# Patient Record
Sex: MALE | Race: BLACK OR AFRICAN AMERICAN | Employment: UNEMPLOYED | RURAL
[De-identification: names, ages, dates, MRNs, and addresses within clinical notes are randomized per-mention and may not be internally consistent; named-entity substitution may affect disease eponyms.]

---

## 2017-07-26 ENCOUNTER — OFFICE VISIT (OUTPATIENT)
Dept: FAMILY MEDICINE CLINIC | Age: 27
End: 2017-07-26

## 2017-07-26 VITALS
TEMPERATURE: 98.9 F | RESPIRATION RATE: 16 BRPM | OXYGEN SATURATION: 97 % | HEIGHT: 77 IN | DIASTOLIC BLOOD PRESSURE: 92 MMHG | HEART RATE: 87 BPM | SYSTOLIC BLOOD PRESSURE: 136 MMHG

## 2017-07-26 DIAGNOSIS — E66.01 MORBID OBESITY DUE TO EXCESS CALORIES (HCC): ICD-10-CM

## 2017-07-26 DIAGNOSIS — G47.33 OSA (OBSTRUCTIVE SLEEP APNEA): ICD-10-CM

## 2017-07-26 DIAGNOSIS — F32.A DEPRESSION, UNSPECIFIED DEPRESSION TYPE: ICD-10-CM

## 2017-07-26 DIAGNOSIS — M54.5 CHRONIC LOW BACK PAIN, UNSPECIFIED BACK PAIN LATERALITY, WITH SCIATICA PRESENCE UNSPECIFIED: ICD-10-CM

## 2017-07-26 DIAGNOSIS — G89.29 CHRONIC LOW BACK PAIN, UNSPECIFIED BACK PAIN LATERALITY, WITH SCIATICA PRESENCE UNSPECIFIED: ICD-10-CM

## 2017-07-26 DIAGNOSIS — Z23 ENCOUNTER FOR IMMUNIZATION: ICD-10-CM

## 2017-07-26 DIAGNOSIS — I10 ESSENTIAL HYPERTENSION: Primary | ICD-10-CM

## 2017-07-26 PROBLEM — M54.50 CHRONIC LOW BACK PAIN: Status: ACTIVE | Noted: 2017-07-26

## 2017-07-26 NOTE — LETTER
7/27/2017 1:10 PM 
 
Mr. Imelda Butt Morning [address] [address] Dear Rubin CAMACHO Morning: 
 
Please find your most recent results below. All labs within normal limits per Dr. Chirag Garg Resulted Orders CBC WITH AUTOMATED DIFF Result Value Ref Range WBC 11.3 (H) 3.4 - 10.8 x10E3/uL  
 RBC 4.56 4.14 - 5.80 x10E6/uL HGB 12.7 12.6 - 17.7 g/dL HCT 39.7 37.5 - 51.0 % MCV 87 79 - 97 fL  
 MCH 27.9 26.6 - 33.0 pg  
 MCHC 32.0 31.5 - 35.7 g/dL  
 RDW 14.6 12.3 - 15.4 % PLATELET 274 527 - 757 x10E3/uL NEUTROPHILS 64 % Lymphocytes 27 % MONOCYTES 6 % EOSINOPHILS 3 % BASOPHILS 0 %  
 ABS. NEUTROPHILS 7.2 (H) 1.4 - 7.0 x10E3/uL Abs Lymphocytes 3.1 0.7 - 3.1 x10E3/uL  
 ABS. MONOCYTES 0.7 0.1 - 0.9 x10E3/uL  
 ABS. EOSINOPHILS 0.3 0.0 - 0.4 x10E3/uL  
 ABS. BASOPHILS 0.0 0.0 - 0.2 x10E3/uL IMMATURE GRANULOCYTES 0 %  
 ABS. IMM. GRANS. 0.0 0.0 - 0.1 x10E3/uL Narrative Performed at:  [address]  [id number] : Mark Wing MD, Phone:  [phone] METABOLIC PANEL, COMPREHENSIVE Result Value Ref Range Glucose 92 65 - 99 mg/dL Comment:  
   Specimen received in contact with cells. No visible hemolysis 
present. However GLUC may be decreased and K increased. Clinical 
correlation indicated. BUN 10 6 - 20 mg/dL Creatinine 0.70 (L) 0.76 - 1.27 mg/dL GFR est non- >59 mL/min/1.73 GFR est  >59 mL/min/1.73  
 BUN/Creatinine ratio 14 9 - 20 Sodium 143 134 - 144 mmol/L Potassium 4.3 3.5 - 5.2 mmol/L Chloride 105 96 - 106 mmol/L  
 CO2 21 18 - 29 mmol/L Calcium 9.0 8.7 - 10.2 mg/dL Protein, total 7.4 6.0 - 8.5 g/dL Albumin 3.9 3.5 - 5.5 g/dL GLOBULIN, TOTAL 3.5 1.5 - 4.5 g/dL A-G Ratio 1.1 (L) 1.2 - 2.2 Bilirubin, total 0.3 0.0 - 1.2 mg/dL Alk. phosphatase 92 39 - 117 IU/L  
 AST (SGOT) 26 0 - 40 IU/L  
 ALT (SGPT) 23 0 - 44 IU/L Narrative Performed at:  [address]  [id number] : Regine Lobo MD, Phone:  [phone] LIPID PANEL Result Value Ref Range Cholesterol, total 175 100 - 199 mg/dL Triglyceride 75 0 - 149 mg/dL HDL Cholesterol 46 >39 mg/dL VLDL, calculated 15 5 - 40 mg/dL LDL, calculated 114 (H) 0 - 99 mg/dL Narrative Performed at:  [address]  [id number] : Regine Lobo MD, Phone:  [phone] URINALYSIS W/ RFLX MICROSCOPIC Result Value Ref Range Specific Gravity 1.029 1.005 - 1.030  
 pH (UA) 5.5 5.0 - 7.5 Color Yellow Yellow Appearance Clear Clear Leukocyte Esterase Negative Negative Protein 1+ (A) Negative/Trace Glucose Negative Negative Ketone Negative Negative Blood Negative Negative Bilirubin Negative Negative Urobilinogen 0.2 0.2 - 1.0 mg/dL Nitrites Negative Negative Microscopic Examination See additional order Comment:  
   Microscopic was indicated and was performed. Narrative Performed at:  [address]  [id number] : Regine Lobo MD, Phone:  [phone] TSH 3RD GENERATION Result Value Ref Range TSH 2.830 0.450 - 4.500 uIU/mL Narrative Performed at:  [address]  [id number] : Regine Lobo MD, Phone:  [phone] CVD REPORT Result Value Ref Range INTERPRETATION Note Comment:  
   Supplement report is available. Narrative Performed at:  [address] [place] [address]  [id number] : Kimball Brittle PhD, Phone:  [phone] MICROSCOPIC EXAMINATION Result Value Ref Range WBC 0-5 0 - 5 /hpf  
 RBC None seen 0 - 2 /hpf Epithelial cells 0-10 0 - 10 /hpf Casts None seen None seen /lpf Mucus Present Not Estab. Bacteria Few None seen/Few Narrative Performed at:  [address]  [id number] : Jennifer Herron MD, Phone:  [phone] RECOMMENDATIONS: 
None. Keep up the good work! Please call me if you have any questions: [phone] Sincerely, Dimas Johnson MD

## 2017-07-26 NOTE — MR AVS SNAPSHOT
Visit Information Date & Time Provider Department Dept. Phone Encounter #  
 7/26/2017 10:40 AM Gorge Benz  [address] [id number] Follow-up Instructions Return in about 1 month (around 8/26/2017). Your Appointments 8/17/2017  9:50 AM  
ESTABLISHED PATIENT with Gorge Benz MD  
[facility] Appt Note: 1 mo f/u $0 CP mbm  
 [address] [address] [facility] [phone]  
[phone]  
  
   
 [address] [facility] [address] Upcoming Health Maintenance Date Due DTaP/Tdap/Td series (1 - Tdap) 7/8/2011 INFLUENZA AGE 9 TO ADULT 8/1/2017 Allergies as of 7/26/2017  Review Complete On: 7/26/2017 By: Gorge Benz MD  
 No Known Allergies Current Immunizations  Never Reviewed No immunizations on file. Not reviewed this visit You Were Diagnosed With   
  
 Codes Comments Essential hypertension    -  Primary ICD-10-CM: I10 
ICD-9-CM: 401.9 Morbid obesity due to excess calories (HCC)     ICD-10-CM: E66.01 
ICD-9-CM: 278.01   
 JONO (obstructive sleep apnea)     ICD-10-CM: G47.33 
ICD-9-CM: 327.23 Depression, unspecified depression type     ICD-10-CM: F32.9 ICD-9-CM: 335 Chronic low back pain, unspecified back pain laterality, with sciatica presence unspecified     ICD-10-CM: M54.5, G89.29 ICD-9-CM: 724.2, 338.29 Vitals BP Pulse Temp Resp Height(growth percentile) SpO2  
 (!) 136/92 (BP 1 Location: Left arm, BP Patient Position: Sitting) 87 98.9 °F (37.2 °C) (Oral) 16 6' 4.5\" (1.943 m) 97% Smoking Status Never Smoker Your Updated Medication List  
  
Notice  As of 7/26/2017 11:56 AM  
 You have not been prescribed any medications. We Performed the Following AMB POC EKG ROUTINE W/ 12 LEADS, INTER & REP [92302 CPT(R)] CBC WITH AUTOMATED DIFF [00025 CPT(R)] LIPID PANEL [45931 CPT(R)] METABOLIC PANEL, COMPREHENSIVE [33804 CPT(R)] MO COLLECTION VENOUS BLOOD,VENIPUNCTURE [id number] CPT(R)] MO HANDLG&/OR CONVEY OF SPEC FOR TR OFFICE TO LAB [06448 CPT(R)] REFERRAL TO BARIATRICS [RCT464 CPT(R)] Comments:  
 Please evaluate patient for morbid obesity over 500 lbs SLEEP MEDICINE REFERRAL [[id number] Custom] Comments:  
 Please evaluate patient for JONO morbid obesity TSH 3RD GENERATION [64128 CPT(R)] URINALYSIS W/ RFLX MICROSCOPIC [39736 CPT(R)] Follow-up Instructions Return in about 1 month (around 8/26/2017). Referral Information Referral ID Referred By Referred To  
  
 [id number] Ova Shingles Not Available Visits Status Start Date End Date 1 New Request 7/26/17 7/26/18 If your referral has a status of pending review or denied, additional information will be sent to support the outcome of this decision. Referral ID Referred By Referred To  
 [id number] Ova Shingles Not Available Visits Status Start Date End Date 1 New Request 7/26/17 7/26/18 If your referral has a status of pending review or denied, additional information will be sent to support the outcome of this decision. Patient Instructions Same meds Awaiting records from free clinic Call with names of meds Lab work Refer to sleep center 
referral to bariatric clinic Introducing [facility] SERVICES! Denise Rodriguez introduces [employer] patient portal. Now you can access parts of your medical record, email your doctor's office, and request medication refills online. 1. In your internet browser, go to [email or web address] 2. Click on the First Time User? Click Here link in the Sign In box. You will see the New Member Sign Up page. 3. Enter your [employer] Access Code exactly as it appears below.  You will not need to use this code after youve completed the sign-up process. If you do not sign up before the expiration date, you must request a new code. · [facility] Access Code: Michelle Villegas Expires: 10/24/2017 11:03 AM 
 
4. Enter the last four digits of your Social Security Number (xxxx) and Date of Birth (mm/dd/yyyy) as indicated and click Submit. You will be taken to the next sign-up page. 5. Create a [facility] ID. This will be your [facility] login ID and cannot be changed, so think of one that is secure and easy to remember. 6. Create a [facility] password. You can change your password at any time. 7. Enter your Password Reset Question and Answer. This can be used at a later time if you forget your password. 8. Enter your e-mail address. You will receive e-mail notification when new information is available in [address] 9. Click Sign Up. You can now view and download portions of your medical record. 10. Click the Download Summary menu link to download a portable copy of your medical information. If you have questions, please visit the Frequently Asked Questions section of the [facility] website. Remember, [facility] is NOT to be used for urgent needs. For medical emergencies, dial 911. Now available from your iPhone and Android! Please provide this summary of care documentation to your next provider. Your primary care clinician is listed as Kadi Galdamez. If you have any questions after today's visit, please call [phone].

## 2017-07-26 NOTE — PROGRESS NOTES
Enoc To is a 32 y.o. male who presents to the office today with the following:  Chief Complaint   Patient presents with    Morbid Obesity     wants to discuss weight loss options       No Known Allergies    No current outpatient prescriptions on file. No current facility-administered medications for this visit. Past Medical History:   Diagnosis Date    Depression     Hypertension        History reviewed. No pertinent surgical history. History   Smoking Status    Never Smoker   Smokeless Tobacco    Never Used       Family History   Problem Relation Age of Onset    Seizures Mother     Sleep Apnea Mother     No Known Problems Father     No Known Problems Sister     Sleep Apnea Brother     No Known Problems Sister     No Known Problems Brother     No Known Problems Brother     No Known Problems Brother     Other Brother      Doesn't know him   [facility] Other Brother      Doesn't know him   [facility] Other Brother      Doesn't know him         History of Present Illness:  Patient here for initial visit to establish in this practice. Patient was previously seen at the free clinic but now has health insurance patient has morbid obesity. He has been heavy his whole life. Parents grandparents are whole quite heavy. He is tried diet he is tried exercise but he has been unsuccessful. Currently weighing over 500 pounds his mother went through bariatric surgery. Patient is interested in pursuing bariatric options. Patient admits that he does snore at night and is tired during the day. Multiple family members do have sleep apnea. He has not been screened. We are setting up this appointment    Patient recently diagnosed with hypertension. Treated with medications to the free clinic. He tolerated the medications well. He ran out about 1 month ago. He does not remember the name but we are getting the records from the sleep clinic. He tells me he had lab work through  his previous clinic.   He has not had an EKG. He has no complaints of chest pain palpitations or shortness of breath. No other cardiac history    He does not report history of diabetes or hyperlipidemia    He does have a history of depression. He admits he gets sad at times he gets angry at times. He has never had any thoughts of hurting himself. He is on a medication for depression from the sleep clinic that still he states helps. His mother apparently has bipolar disease. He denies any manic episodes. He denies any alcohol use or drug use    He does have some low back pain. This is worse when he tries to stand up for a prolonged period of time. This is likely exacerbated by his weight. It has prevented him from looking for full-time work  He denies any extremity symptoms    It is been over 10 years since his last tetanus booster. Tdap given today          Review of Systems:      Review of systems negative except as noted above    Physical Exam:  Visit Vitals    BP (!) 136/92 (BP 1 Location: Left arm, BP Patient Position: Sitting)  Comment (BP 1 Location): used forarm because no BP here fit his upper arm    Pulse 87    Temp 98.9 °F (37.2 °C) (Oral)    Resp 16    Ht 6' 4.5\" (1.943 m)    SpO2 97%     Vitals:    07/26/17 1103   BP: (!) 136/92   BP 1 Location: Left arm  Comment: used forarm because no BP here fit his upper arm   BP Patient Position: Sitting   Pulse: 87   Resp: 16   Temp: 98.9 °F (37.2 °C)   TempSrc: Oral   SpO2: 97%   Weight: Comment: Greater then 508 lbs   Height: 6' 4.5\" (1.943 m)     Patient no acute distress vital signs  as above unable to weigh patient on our scales. Blood pressure taken on his forearm secondary to the size of his arms  Head is normocephalic  Patient dressed appropriately. Good eye contact. Did not appear anxious or distressed. No psychotic or suicidal ideations  External ears normal.    Eyes PERRLA. EOMs full. Sclera clear  Nose within normal limits.   Nares  normal.  No significant congestion  OP mucosa normal, no obvious lesions. Pharynx normal.  No erythema or exudate. Structures midline. Neck no nodes no obvious masses no bruits. Limited exam secondary to size  Chest was clear no wheezes rhonchi or rales. Good air exchange  Cor distant heart sounds secondary to his size. Regular rate and rhythm no S3-S4 no murmurs  Abdomen morbidly obese no HSM no obvious masses soft and was nontender  Extremities no pitting edema. Nontender. Good range of motion  Gait and gross neurologic exam were normal  EKG showed normal sinus rhythm. Horizontal axis. Negative acute. Suspect Axis secondary to his weight and the fact that EKG was done while sitting      Assessment/Plan:  1. Essential hypertension  Patient with hypertension. Blood pressure just a bit above goal today. We are finding out what medication he has been treated with and I will be most likely calling in a refill for this. I am checking lab work and an EKG today. I am bringing him back in 1 month for recheck and follow-up  - AMB POC EKG ROUTINE W/ 12 LEADS, INTER & REP  - CBC WITH AUTOMATED DIFF  - METABOLIC PANEL, COMPREHENSIVE  - LIPID PANEL  - URINALYSIS W/ RFLX MICROSCOPIC  - TSH 3RD GENERATION    2. Morbid obesity due to excess calories [facility])  Patient with morbid obesity and probable sleep apnea. I am referring him both to bariatrics as well as the sleep clinic. He will continue working on his diet and excise program I will see him back in a month  - [address] [place] [address]    3. JONO (obstructive sleep apnea)  Referring to sleep clinic    4. Depression, unspecified depression type  Improved with his current medications. We are determining his previous medications and we will call this in    5.  Chronic low back pain, unspecified back pain laterality, with sciatica presence unspecified  Hopefully will improve with weight loss      Patient Instructions   Same meds  Awaiting records from free clinic  Call with names of meds  Lab work  Refer to sleep center  referral to bariatric clinic        Continue current therapy plan except for indicated above. Verbal and written instructions (see AVS) provided.  Patient expresses understanding of diagnosis and treatment plan. Follow-up Disposition:  Return in about 1 month (around 8/26/2017). So Martinez.  Terri Causey MD

## 2017-07-26 NOTE — PATIENT INSTRUCTIONS
Same meds  Awaiting records from free clinic  Call with names of meds  Lab work  Refer to sleep center  referral to bariatric clinic

## 2017-07-27 LAB
ALBUMIN SERPL-MCNC: 3.9 G/DL (ref 3.5–5.5)
ALBUMIN/GLOB SERPL: 1.1 {RATIO} (ref 1.2–2.2)
ALP SERPL-CCNC: 92 IU/L (ref 39–117)
ALT SERPL-CCNC: 23 IU/L (ref 0–44)
APPEARANCE UR: CLEAR
AST SERPL-CCNC: 26 IU/L (ref 0–40)
BACTERIA #/AREA URNS HPF: NORMAL /[HPF]
BASOPHILS # BLD AUTO: 0 X10E3/UL (ref 0–0.2)
BASOPHILS NFR BLD AUTO: 0 %
BILIRUB SERPL-MCNC: 0.3 MG/DL (ref 0–1.2)
BILIRUB UR QL STRIP: NEGATIVE
BUN SERPL-MCNC: 10 MG/DL (ref 6–20)
BUN/CREAT SERPL: 14 (ref 9–20)
CALCIUM SERPL-MCNC: 9 MG/DL (ref 8.7–10.2)
CASTS URNS QL MICRO: NORMAL /LPF
CHLORIDE SERPL-SCNC: 105 MMOL/L (ref 96–106)
CHOLEST SERPL-MCNC: 175 MG/DL (ref 100–199)
CO2 SERPL-SCNC: 21 MMOL/L (ref 18–29)
COLOR UR: YELLOW
CREAT SERPL-MCNC: 0.7 MG/DL (ref 0.76–1.27)
EOSINOPHIL # BLD AUTO: 0.3 X10E3/UL (ref 0–0.4)
EOSINOPHIL NFR BLD AUTO: 3 %
EPI CELLS #/AREA URNS HPF: NORMAL /HPF
ERYTHROCYTE [DISTWIDTH] IN BLOOD BY AUTOMATED COUNT: 14.6 % (ref 12.3–15.4)
GLOBULIN SER CALC-MCNC: 3.5 G/DL (ref 1.5–4.5)
GLUCOSE SERPL-MCNC: 92 MG/DL (ref 65–99)
GLUCOSE UR QL: NEGATIVE
HCT VFR BLD AUTO: 39.7 % (ref 37.5–51)
HDLC SERPL-MCNC: 46 MG/DL
HGB BLD-MCNC: 12.7 G/DL (ref 12.6–17.7)
HGB UR QL STRIP: NEGATIVE
IMM GRANULOCYTES # BLD: 0 X10E3/UL (ref 0–0.1)
IMM GRANULOCYTES NFR BLD: 0 %
INTERPRETATION, 910389: NORMAL
KETONES UR QL STRIP: NEGATIVE
LDLC SERPL CALC-MCNC: 114 MG/DL (ref 0–99)
LEUKOCYTE ESTERASE UR QL STRIP: NEGATIVE
LYMPHOCYTES # BLD AUTO: 3.1 X10E3/UL (ref 0.7–3.1)
LYMPHOCYTES NFR BLD AUTO: 27 %
MCH RBC QN AUTO: 27.9 PG (ref 26.6–33)
MCHC RBC AUTO-ENTMCNC: 32 G/DL (ref 31.5–35.7)
MCV RBC AUTO: 87 FL (ref 79–97)
MICRO URNS: ABNORMAL
MONOCYTES # BLD AUTO: 0.7 X10E3/UL (ref 0.1–0.9)
MONOCYTES NFR BLD AUTO: 6 %
MUCOUS THREADS URNS QL MICRO: PRESENT
NEUTROPHILS # BLD AUTO: 7.2 X10E3/UL (ref 1.4–7)
NEUTROPHILS NFR BLD AUTO: 64 %
NITRITE UR QL STRIP: NEGATIVE
PH UR STRIP: 5.5 [PH] (ref 5–7.5)
PLATELET # BLD AUTO: 324 X10E3/UL (ref 150–379)
POTASSIUM SERPL-SCNC: 4.3 MMOL/L (ref 3.5–5.2)
PROT SERPL-MCNC: 7.4 G/DL (ref 6–8.5)
PROT UR QL STRIP: ABNORMAL
RBC # BLD AUTO: 4.56 X10E6/UL (ref 4.14–5.8)
RBC #/AREA URNS HPF: NORMAL /HPF
SODIUM SERPL-SCNC: 143 MMOL/L (ref 134–144)
SP GR UR: 1.03 (ref 1–1.03)
TRIGL SERPL-MCNC: 75 MG/DL (ref 0–149)
TSH SERPL DL<=0.005 MIU/L-ACNC: 2.83 UIU/ML (ref 0.45–4.5)
UROBILINOGEN UR STRIP-MCNC: 0.2 MG/DL (ref 0.2–1)
VLDLC SERPL CALC-MCNC: 15 MG/DL (ref 5–40)
WBC # BLD AUTO: 11.3 X10E3/UL (ref 3.4–10.8)
WBC #/AREA URNS HPF: NORMAL /HPF

## 2017-07-27 NOTE — PROGRESS NOTES
Recent lab work reviewed  All lab tests look good, within acceptable limits  Patient should continue the current medications  Continue healthy diet    Keep planned follow-up with bariatric clinic and with me

## 2017-08-14 ENCOUNTER — TELEPHONE (OUTPATIENT)
Dept: FAMILY MEDICINE CLINIC | Age: 27
End: 2017-08-14

## 2017-08-14 RX ORDER — LISINOPRIL 10 MG/1
10 TABLET ORAL DAILY
Qty: 30 TAB | Refills: 1 | Status: SHIPPED | OUTPATIENT
Start: 2017-08-14 | End: 2017-12-21

## 2017-08-14 RX ORDER — BUPROPION HYDROCHLORIDE 150 MG/1
150 TABLET, EXTENDED RELEASE ORAL 2 TIMES DAILY
Qty: 60 TAB | Refills: 1 | Status: SHIPPED | OUTPATIENT
Start: 2017-08-14 | End: 2017-12-21

## 2017-08-14 NOTE — TELEPHONE ENCOUNTER
I have called and left a message for this patient to return my call to discuss these medications the Dr Robbin Pollack has refilled  Jeanine Crane RN

## 2017-08-14 NOTE — TELEPHONE ENCOUNTER
Patient seen recently for initial visit    Diagnosis of hypertension and depression    He did not recall the names of his medications but wanted refills    I received records from the [facility]    He was on lisinopril 10 mg daily for his hypertension  Bupropion 150 twice daily for depression    I have written a prescription for both medications for 2 months    Patient is scheduled to see me back end of the month    Please contact the patient and let him know he can  the prescriptions and get them filled at his pharmacy in the interim

## 2017-08-14 NOTE — TELEPHONE ENCOUNTER
Patient called back, I advised him of refills at the pharmacy, importance of keeping follow up apt w/Dr Compa Proctor. Also, spoke to him about the apt for sleep study, they advise that they have already talked to someone and will calling back to make an apt at a later time.   Tina Hope RN

## 2017-12-22 ENCOUNTER — TELEPHONE (OUTPATIENT)
Dept: FAMILY MEDICINE CLINIC | Age: 27
End: 2017-12-22

## 2018-02-20 RX ORDER — LISINOPRIL 10 MG/1
TABLET ORAL
Qty: 30 TAB | Refills: 0 | Status: SHIPPED | OUTPATIENT
Start: 2018-02-20

## 2018-02-20 NOTE — TELEPHONE ENCOUNTER
Patient called requesting refill of lisinopril  I saw him 6 months ago  He was supposed to follow-up with me in 2 months and has not  Refill for 1 month has been called to the pharmacy and patient to be contacted and told to schedule follow-up for recheck

## 2018-02-20 NOTE — TELEPHONE ENCOUNTER
I have called and left a message for this patient's mother to return my call to discuss need for apt.

## 2018-02-21 NOTE — TELEPHONE ENCOUNTER
I have called and talked to this patient's mother, I have transferred call to PSR's to make apt to be seen.

## 2018-03-27 RX ORDER — BUPROPION HYDROCHLORIDE 150 MG/1
150 TABLET, EXTENDED RELEASE ORAL 2 TIMES DAILY
Qty: 60 TAB | Refills: 1 | OUTPATIENT
Start: 2018-03-27

## 2018-03-27 NOTE — TELEPHONE ENCOUNTER
Requested Prescriptions     Pending Prescriptions Disp Refills    buPROPion SR (WELLBUTRIN SR) 150 mg SR tablet 60 Tab 1     Sig: Take 1 Tab by mouth two (2) times a day.

## 2018-03-29 NOTE — TELEPHONE ENCOUNTER
I have called and talked to patient's mother Saud Valle), she advises that patient has an apt on Tuesday 4/3/18 with Dr Ingrid Reid.